# Patient Record
Sex: FEMALE | Race: WHITE | Employment: OTHER | URBAN - METROPOLITAN AREA
[De-identification: names, ages, dates, MRNs, and addresses within clinical notes are randomized per-mention and may not be internally consistent; named-entity substitution may affect disease eponyms.]

---

## 2017-01-01 ENCOUNTER — MEDICAL CORRESPONDENCE (OUTPATIENT)
Dept: HEALTH INFORMATION MANAGEMENT | Facility: CLINIC | Age: 82
End: 2017-01-01

## 2017-01-01 ENCOUNTER — ASSISTED LIVING VISIT (OUTPATIENT)
Dept: GERIATRICS | Facility: CLINIC | Age: 82
End: 2017-01-01
Payer: MEDICARE

## 2017-01-01 VITALS
DIASTOLIC BLOOD PRESSURE: 78 MMHG | WEIGHT: 193 LBS | SYSTOLIC BLOOD PRESSURE: 136 MMHG | BODY MASS INDEX: 36.49 KG/M2 | HEART RATE: 94 BPM

## 2017-01-01 DIAGNOSIS — I50.32 CHRONIC DIASTOLIC CONGESTIVE HEART FAILURE (H): Primary | ICD-10-CM

## 2017-01-01 DIAGNOSIS — F22 DELUSIONS (H): ICD-10-CM

## 2017-01-01 DIAGNOSIS — R06.00 DYSPNEA: ICD-10-CM

## 2017-01-01 PROCEDURE — 99207 ZZC CDG-CORRECTLY CODED, REVIEWED AND AGREE: CPT | Performed by: NURSE PRACTITIONER

## 2017-01-01 RX ORDER — HALOPERIDOL 2 MG/ML
0.5 SOLUTION ORAL EVERY 4 HOURS PRN
COMMUNITY
End: 2017-01-01

## 2017-01-01 RX ORDER — HYDROMORPHONE HYDROCHLORIDE 1 MG/ML
4 SOLUTION ORAL
COMMUNITY

## 2017-01-01 RX ORDER — HYDROMORPHONE HYDROCHLORIDE 1 MG/ML
4 SOLUTION ORAL EVERY 4 HOURS
COMMUNITY

## 2017-01-01 RX ORDER — HALOPERIDOL 2 MG/ML
0.5 SOLUTION ORAL 2 TIMES DAILY
COMMUNITY
End: 2017-01-01

## 2017-01-01 RX ORDER — HALOPERIDOL 1 MG/1
0.5 TABLET ORAL 4 TIMES DAILY
COMMUNITY

## 2017-01-01 RX ORDER — HALOPERIDOL 0.5 MG/1
0.5 TABLET ORAL 4 TIMES DAILY PRN
COMMUNITY

## 2017-01-06 NOTE — PROGRESS NOTES
[place] [facility]    Chief Complaint   Patient presents with     RECHECK       HPI:    Slime Parra is a 88 year old  (1/8/1928), who is being seen today for an episodic care visit at [facility]. Today's concern is:  Chronic diastolic congestive heart failure (H)  Dyspnea  Delusions (H)  Slime is actively dying.  She is calm and now on total bed rest with chronic O2 @ 2L/NC.  She is no longer able to manage swallowing all of her medications so today we discontinued Torsemide, Metoprolol and others.  We will order Liquid Hydromorphone.  Daughter spent the night and stated that Slime had a peaceful night.        ALLERGIES: Novocain and Penicillins  Past Medical, Surgical, Family and Social History reviewed and updated in [employer].    Current Outpatient Prescriptions   Medication Sig Dispense Refill     haloperidol (HALDOL) 1 MG tablet Take 0.5 mg by mouth 4 times daily       haloperidol (HALDOL) 0.5 MG tablet Take 0.5 mg by mouth 4 times daily as needed for agitation or other (delusions)       HYDROmorphone (DILAUDID HIGH CONCENTRATION) 10 MG/ML LIQD (HIGH CONC) solution Place 4 mg under the tongue every 4 hours       HYDROmorphone (DILAUDID HIGH CONCENTRATION) 10 MG/ML LIQD (HIGH CONC) solution Place 4 mg under the tongue every 2 hours as needed for moderate to severe pain       acetaminophen (TYLENOL) 650 MG Suppository Place 650 mg rectally every 4 hours as needed for fever       sodium chloride (OCEAN) 0.65 % nasal spray Spray 2 sprays into both nostrils every 12 hours as needed for congestion       atropine 1 % ophthalmic solution Place 2 drops under the tongue 3 times daily       silver sulfADIAZINE (SILVADENE) 1 % cream Apply topically 2 times daily       ipratropium - albuterol 0.5 mg/2.5 mg/3 mL (DUONEB) 0.5-2.5 (3) MG/3ML neb solution Take 1 vial (3 mLs) by nebulization every 6 hours as needed for shortness of breath / dyspnea or wheezing 360 mL PRN     ipratropium - albuterol 0.5 mg/2.5 mg/3 mL (DUONEB)  0.5-2.5 (3) MG/3ML neb solution Take 1 vial (3 mLs) by nebulization 4 times daily may also give 1 vial Q6H  mL PRN     atropine 1 % ophthalmic solution Place 2 drops under the tongue every 2 hours as needed        bisacodyl (DULCOLAX) 10 MG Suppository Place 10 mg rectally daily as needed for constipation       LORAZEPAM PO Take 0.25 mg by mouth every 4 hours as needed for anxiety       budesonide (PULMICORT) 0.5 MG/2ML neb solution Take 2 mLs (0.5 mg) by nebulization 2 times daily 120 ampule 3     acetaminophen (TYLENOL ARTHRITIS PAIN) 650 MG CR tablet Take 1 tablet (650 mg) by mouth 2 times daily AND MAY TAKE 1 TABLET ONCE DAILY AS NEEDED 93 tablet 98     Medications reviewed:  Medications reconciled to facility chart and changes were made to reflect current medications as identified as above med list. Below are the changes that were made:   Medications stopped since last EPIC medication reconciliation:   There are no discontinued medications.    Medications started since last [facility] medication reconciliation:  No orders of the defined types were placed in this encounter.     REVIEW OF SYSTEMS:  Unobtainable secondary to unresponsive.    Physical Exam:  /78 mmHg  Pulse 94  Wt 193 lb (87.544 kg)  GENERAL APPEARANCE:  morbidly obese, unresponsive  NECK:  No adenopathy,masses or thyromegaly  RESP:  diminished breath sounds 3/4 up lung fields, rales upper bilaterally, subpharyngeal congestion  CV:  Palpation and auscultation of heart done , peripheral edema 3+ in all 4 extremities, face, irregular rhythm baseline  ABDOMEN:  normal bowel sounds, soft, nontender, no hepatosplenomegaly or other masses, no guarding or rebound  PSYCH:  insight and judgement impaired, verbalizing occasionally about distant past  events.    Recent Labs:      Last Basic Metabolic Panel:  NA      142   12/24/2016   POTASSIUM      4.1   12/24/2016  CHLORIDE      101   12/24/2016  ANDRZEJ      8.4   12/24/2016  CO2       34    12/24/2016  BUN       51   12/24/2016  CR     1.65   12/24/2016  GLC       87   12/24/2016    WBC     19.2   12/24/2016  RBC     3.75   12/24/2016  HGB     10.3   12/24/2016  HCT     33.1   12/24/2016  MCV       88   12/24/2016  MCH     27.5   12/24/2016  MCHC     31.1   12/24/2016  RDW     18.6   12/24/2016  PLT      319   12/24/2016  PLT      288   10/3/2016    Assessment/Plan:  1. Chronic diastolic congestive heart failure (H)    2. Dyspnea    3. Delusions (H)      Orders:  Discontinue oral medications that are not comfort focused  Change Hydromorphone to liquid and increase PRN dose to same as scheduled dose of 4 mg.    Time 45 min    Electronically signed by  ROBERTO Gilmore CNP

## 2017-01-16 ENCOUNTER — CARE COORDINATION (OUTPATIENT)
Dept: GERIATRIC MEDICINE | Facility: CLINIC | Age: 82
End: 2017-01-16

## 2017-01-16 NOTE — PROGRESS NOTES
Client enrolled with [facility] 1/1/17, rec'd notification that client passed away 1/8/17. [facility] death notification completed and faxed to [facility].  Courtney Rodriguez RN, BC  Supervisor [facility]   [phone] (Fax)